# Patient Record
Sex: MALE | Race: WHITE | Employment: OTHER | URBAN - NONMETROPOLITAN AREA
[De-identification: names, ages, dates, MRNs, and addresses within clinical notes are randomized per-mention and may not be internally consistent; named-entity substitution may affect disease eponyms.]

---

## 2020-01-01 ENCOUNTER — TELEPHONE (OUTPATIENT)
Dept: FAMILY MEDICINE CLINIC | Facility: CLINIC | Age: 80
End: 2020-01-01

## 2020-10-08 ENCOUNTER — TELEPHONE (OUTPATIENT)
Dept: FAMILY MEDICINE CLINIC | Facility: CLINIC | Age: 80
End: 2020-10-08

## 2020-10-08 NOTE — TELEPHONE ENCOUNTER
MEDICAL RECORD REQUEST FAXED TO DR FREITAS LRFJOELIS'V OFFICE FOR RECORDS FROM OFFICE NOTES SEPTEMBER 2020

## 2020-10-13 ENCOUNTER — OFFICE VISIT (OUTPATIENT)
Dept: FAMILY MEDICINE CLINIC | Facility: CLINIC | Age: 80
End: 2020-10-13
Payer: MEDICARE

## 2020-10-13 ENCOUNTER — LAB ENCOUNTER (OUTPATIENT)
Dept: LAB | Age: 80
End: 2020-10-13
Attending: INTERNAL MEDICINE
Payer: MEDICARE

## 2020-10-13 VITALS
SYSTOLIC BLOOD PRESSURE: 128 MMHG | BODY MASS INDEX: 24.96 KG/M2 | TEMPERATURE: 97 F | HEIGHT: 67 IN | WEIGHT: 159 LBS | DIASTOLIC BLOOD PRESSURE: 86 MMHG | OXYGEN SATURATION: 98 % | RESPIRATION RATE: 16 BRPM | HEART RATE: 86 BPM

## 2020-10-13 DIAGNOSIS — I10 ESSENTIAL HYPERTENSION: Primary | ICD-10-CM

## 2020-10-13 DIAGNOSIS — R60.0 LEG EDEMA, LEFT: ICD-10-CM

## 2020-10-13 DIAGNOSIS — E78.00 HYPERCHOLESTEREMIA: ICD-10-CM

## 2020-10-13 DIAGNOSIS — I10 ESSENTIAL HYPERTENSION: ICD-10-CM

## 2020-10-13 DIAGNOSIS — G14 POST-POLIO SYNDROME: ICD-10-CM

## 2020-10-13 PROCEDURE — 99203 OFFICE O/P NEW LOW 30 MIN: CPT | Performed by: INTERNAL MEDICINE

## 2020-10-13 PROCEDURE — 90662 IIV NO PRSV INCREASED AG IM: CPT | Performed by: INTERNAL MEDICINE

## 2020-10-13 PROCEDURE — G0008 ADMIN INFLUENZA VIRUS VAC: HCPCS | Performed by: INTERNAL MEDICINE

## 2020-10-13 PROCEDURE — 80053 COMPREHEN METABOLIC PANEL: CPT

## 2020-10-13 PROCEDURE — 85025 COMPLETE CBC W/AUTO DIFF WBC: CPT

## 2020-10-13 PROCEDURE — 36415 COLL VENOUS BLD VENIPUNCTURE: CPT

## 2020-10-13 PROCEDURE — 80061 LIPID PANEL: CPT

## 2020-10-13 RX ORDER — FLUTICASONE PROPIONATE 50 MCG
50 SPRAY, SUSPENSION (ML) NASAL AS NEEDED
COMMUNITY
Start: 2020-08-20

## 2020-10-13 RX ORDER — OMEPRAZOLE 40 MG/1
40 CAPSULE, DELAYED RELEASE ORAL DAILY
COMMUNITY
Start: 2020-09-29

## 2020-10-13 RX ORDER — TRIAMTERENE AND HYDROCHLOROTHIAZIDE 37.5; 25 MG/1; MG/1
37.5 CAPSULE ORAL DAILY
COMMUNITY
Start: 2020-09-29 | End: 2020-10-14

## 2020-10-13 RX ORDER — POTASSIUM CHLORIDE 1500 MG/1
20 TABLET, FILM COATED, EXTENDED RELEASE ORAL DAILY
COMMUNITY
Start: 2020-07-14 | End: 2021-01-01

## 2020-10-13 RX ORDER — UBIDECARENONE 75 MG
250 CAPSULE ORAL DAILY
COMMUNITY

## 2020-10-13 RX ORDER — ATORVASTATIN CALCIUM 80 MG/1
80 TABLET, FILM COATED ORAL DAILY
COMMUNITY
Start: 2020-09-29

## 2020-10-13 RX ORDER — BENAZEPRIL HYDROCHLORIDE 40 MG/1
40 TABLET, FILM COATED ORAL DAILY
COMMUNITY
Start: 2020-09-29

## 2020-10-13 NOTE — PROGRESS NOTES
Ailyn Mathur is a 68year old male. HPI:   Pt has moved to [place] from [place] to be closer to family. 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   He has significant dementia and uses humor to cover up his i atraumatic, normocephalic,ears and throat are clear  NECK: supple,no adenopathy,no bruits  LUNGS: clear to auscultation  CARDIO: RRR without murmur  GI: good BS's,no masses, HSM or tenderness  EXTREMITIES: bilateral edema, left leg swelling plus 4 pitting

## 2020-10-14 PROBLEM — G14 POST-POLIO SYNDROME: Status: ACTIVE | Noted: 2020-10-14

## 2020-10-14 PROBLEM — Z96.652 S/P TKR (TOTAL KNEE REPLACEMENT), LEFT: Status: ACTIVE | Noted: 2020-10-14

## 2020-10-14 PROBLEM — F02.80 LATE ONSET ALZHEIMER'S DISEASE WITHOUT BEHAVIORAL DISTURBANCE (HCC): Status: ACTIVE | Noted: 2020-10-14

## 2020-10-14 PROBLEM — E78.00 HYPERCHOLESTEROLEMIA: Status: ACTIVE | Noted: 2020-10-14

## 2020-10-14 PROBLEM — G30.1 LATE ONSET ALZHEIMER'S DISEASE WITHOUT BEHAVIORAL DISTURBANCE (HCC): Status: ACTIVE | Noted: 2020-10-14

## 2020-10-14 PROBLEM — K21.9 GASTROESOPHAGEAL REFLUX DISEASE WITHOUT ESOPHAGITIS: Status: ACTIVE | Noted: 2020-10-14

## 2020-10-14 PROBLEM — M51.36 DDD (DEGENERATIVE DISC DISEASE), LUMBAR: Status: ACTIVE | Noted: 2020-10-14

## 2020-10-14 PROBLEM — M48.00 CENTRAL STENOSIS OF SPINAL CANAL: Status: ACTIVE | Noted: 2020-10-14

## 2020-10-14 RX ORDER — FUROSEMIDE 20 MG/1
20 TABLET ORAL DAILY
Qty: 30 TABLET | Refills: 0 | Status: SHIPPED | OUTPATIENT
Start: 2020-10-14 | End: 2020-10-16

## 2020-10-16 RX ORDER — FUROSEMIDE 20 MG/1
20 TABLET ORAL DAILY
Qty: 30 TABLET | Refills: 0 | Status: SHIPPED | OUTPATIENT
Start: 2020-10-16 | End: 2020-01-01

## 2020-10-29 ENCOUNTER — LAB ENCOUNTER (OUTPATIENT)
Dept: LAB | Age: 80
End: 2020-10-29
Attending: INTERNAL MEDICINE
Payer: MEDICARE

## 2020-10-29 ENCOUNTER — OFFICE VISIT (OUTPATIENT)
Dept: FAMILY MEDICINE CLINIC | Facility: CLINIC | Age: 80
End: 2020-10-29
Payer: MEDICARE

## 2020-10-29 VITALS
TEMPERATURE: 98 F | OXYGEN SATURATION: 97 % | SYSTOLIC BLOOD PRESSURE: 122 MMHG | DIASTOLIC BLOOD PRESSURE: 72 MMHG | RESPIRATION RATE: 20 BRPM | HEART RATE: 70 BPM | HEIGHT: 67 IN | BODY MASS INDEX: 24.7 KG/M2 | WEIGHT: 157.38 LBS

## 2020-10-29 DIAGNOSIS — R60.0 LEG EDEMA, LEFT: Primary | ICD-10-CM

## 2020-10-29 DIAGNOSIS — R60.0 LEG EDEMA, LEFT: ICD-10-CM

## 2020-10-29 PROCEDURE — 36415 COLL VENOUS BLD VENIPUNCTURE: CPT

## 2020-10-29 PROCEDURE — 99214 OFFICE O/P EST MOD 30 MIN: CPT | Performed by: INTERNAL MEDICINE

## 2020-10-29 PROCEDURE — 80053 COMPREHEN METABOLIC PANEL: CPT

## 2020-10-29 NOTE — PROGRESS NOTES
Selvin Cowan is a 68year old male. HPI:   Pt has been taking lasix at an increased does and leg swelling is much better. He has been taking potassium too.   Needs [place] ID, but can no longer drive and parking palacard for the car due to his post polio s and throat are clear  NECK: supple,no adenopathy,no bruits  LUNGS: clear to auscultation  CARDIO: RRR without murmur  GI: good BS's,no masses, HSM or tenderness  EXTREMITIES: no cyanosis, edema in the left leg only, doughy, not pitting in the tibia and has

## 2020-11-06 ENCOUNTER — TELEPHONE (OUTPATIENT)
Dept: FAMILY MEDICINE CLINIC | Facility: CLINIC | Age: 80
End: 2020-11-06

## 2020-11-06 NOTE — TELEPHONE ENCOUNTER
Called to  by registration staff. States patient and wife are in lobby here requesting appt this afternoon with Dr. Feliz Rodriguez.  Per patient he fell at home 2 days ago, tripped over something in living room and fell onto carpeted floor hitting left trey

## 2020-11-13 NOTE — TELEPHONE ENCOUNTER
INSURANCE WILL NOT FILL MORE THAN 7 DAYS OF TRAMADOL THEN WILL NEED A NEW SCRIPT, ALSO  NEEDS INDICATION (CHRONIC PAIN OR QPAIN)    PLEASE CALL PHARMACY

## 2020-11-14 NOTE — PROGRESS NOTES
Jass Lees is a 68year old male. HPI:   Pt has been having more pain in the back and irritable. His dementia is worsening and has some personality changes. Impatient, argumentative and poor appetite.    Current Outpatient Medications   Medication S nourished,in no apparent distress  SKIN: no rashes,no suspicious lesions  HEENT: atraumatic, normocephalic,ears and throat are clear  NECK: supple,no adenopathy,no bruits  LUNGS: clear to auscultation  CARDIO: RRR without murmur  GI: good BS's,no masses, H

## 2020-11-20 NOTE — TELEPHONE ENCOUNTER
Talk to nurse about medication, she is outside in the yard and will call back after 1pm to speak to nurse

## 2020-12-10 NOTE — PROGRESS NOTES
Mallorie Carpenter is a 68year old male. HPI:   Symptoms are escalating. He is angry and frustrated, His body hurts all the time. Risperidone is helping and sometimes his wife will give him an additional one in the day time.  He is aggressive with his cane 97.5 °F (36.4 °C)   Resp 16   Ht 5' 7\" (1.702 m)   Wt 162 lb (73.5 kg)   SpO2 96%   BMI 25.37 kg/m²   GENERAL: well developed, well nourished,in no apparent distress  SKIN: no rashes,no suspicious lesions  HEENT: atraumatic, normocephalic,ears and throat

## 2020-12-11 NOTE — TELEPHONE ENCOUNTER
Called Dr. Jonna Hawkins office. Appointment scheduled for 1/27/2021 in [place] at [address] [facility].  Wife notified. Contact information given. Routed to Dr. Chacho Corcoran for fyi.

## 2020-12-14 NOTE — TELEPHONE ENCOUNTER
Wojciech Orozco is calling when she went to  Aldo's medication from [facility] in [place], they would only give her the Sertraline 50 mg but they said that they wanted to speak with Dr Feliz Rodriguez prior to giving them the risperidone 0.25 mg.   Wojciech Orozco would like to speak

## 2020-12-14 NOTE — TELEPHONE ENCOUNTER
Alex Liz at [facility] said they are getting the script for risperidone redy for the patient.  Attempted to call wife, DAYANNA at 230pm. DAYANNA 455pm

## 2021-01-01 ENCOUNTER — OFFICE VISIT (OUTPATIENT)
Dept: FAMILY MEDICINE CLINIC | Facility: CLINIC | Age: 81
End: 2021-01-01
Payer: MEDICARE

## 2021-01-01 ENCOUNTER — TELEPHONE (OUTPATIENT)
Dept: FAMILY MEDICINE CLINIC | Facility: CLINIC | Age: 81
End: 2021-01-01

## 2021-01-01 ENCOUNTER — MED REC SCAN ONLY (OUTPATIENT)
Dept: FAMILY MEDICINE CLINIC | Facility: CLINIC | Age: 81
End: 2021-01-01

## 2021-01-01 ENCOUNTER — LAB ENCOUNTER (OUTPATIENT)
Dept: LAB | Age: 81
End: 2021-01-01
Attending: INTERNAL MEDICINE
Payer: MEDICARE

## 2021-01-01 ENCOUNTER — HOSPITAL ENCOUNTER (OUTPATIENT)
Dept: GENERAL RADIOLOGY | Age: 81
Discharge: HOME | End: 2021-01-01
Attending: NURSE PRACTITIONER
Payer: MEDICARE

## 2021-01-01 ENCOUNTER — TELEPHONE (OUTPATIENT)
Dept: NEUROLOGY | Facility: CLINIC | Age: 81
End: 2021-01-01

## 2021-01-01 VITALS
HEART RATE: 68 BPM | OXYGEN SATURATION: 97 % | DIASTOLIC BLOOD PRESSURE: 70 MMHG | SYSTOLIC BLOOD PRESSURE: 120 MMHG | TEMPERATURE: 98 F | BODY MASS INDEX: 25 KG/M2 | WEIGHT: 172 LBS

## 2021-01-01 VITALS
OXYGEN SATURATION: 97 % | RESPIRATION RATE: 20 BRPM | HEIGHT: 69 IN | HEART RATE: 82 BPM | BODY MASS INDEX: 24.88 KG/M2 | DIASTOLIC BLOOD PRESSURE: 80 MMHG | TEMPERATURE: 97 F | WEIGHT: 168 LBS | SYSTOLIC BLOOD PRESSURE: 128 MMHG

## 2021-01-01 VITALS
SYSTOLIC BLOOD PRESSURE: 138 MMHG | DIASTOLIC BLOOD PRESSURE: 80 MMHG | HEIGHT: 69 IN | BODY MASS INDEX: 24.88 KG/M2 | HEART RATE: 63 BPM | RESPIRATION RATE: 18 BRPM | OXYGEN SATURATION: 98 % | WEIGHT: 168 LBS | TEMPERATURE: 97 F

## 2021-01-01 VITALS
SYSTOLIC BLOOD PRESSURE: 109 MMHG | DIASTOLIC BLOOD PRESSURE: 65 MMHG | HEIGHT: 69 IN | OXYGEN SATURATION: 96 % | RESPIRATION RATE: 18 BRPM | HEART RATE: 83 BPM | BODY MASS INDEX: 25.33 KG/M2 | WEIGHT: 171 LBS | TEMPERATURE: 97 F

## 2021-01-01 VITALS
WEIGHT: 175 LBS | HEIGHT: 69 IN | OXYGEN SATURATION: 98 % | BODY MASS INDEX: 25.92 KG/M2 | TEMPERATURE: 99 F | DIASTOLIC BLOOD PRESSURE: 72 MMHG | RESPIRATION RATE: 18 BRPM | HEART RATE: 86 BPM | SYSTOLIC BLOOD PRESSURE: 119 MMHG

## 2021-01-01 VITALS
WEIGHT: 170.5 LBS | TEMPERATURE: 97 F | HEART RATE: 67 BPM | DIASTOLIC BLOOD PRESSURE: 72 MMHG | BODY MASS INDEX: 25.25 KG/M2 | RESPIRATION RATE: 16 BRPM | OXYGEN SATURATION: 97 % | HEIGHT: 69 IN | SYSTOLIC BLOOD PRESSURE: 126 MMHG

## 2021-01-01 VITALS
DIASTOLIC BLOOD PRESSURE: 80 MMHG | HEART RATE: 86 BPM | SYSTOLIC BLOOD PRESSURE: 136 MMHG | HEIGHT: 69 IN | RESPIRATION RATE: 18 BRPM | OXYGEN SATURATION: 95 % | WEIGHT: 173 LBS | BODY MASS INDEX: 25.62 KG/M2 | TEMPERATURE: 98 F

## 2021-01-01 VITALS
HEART RATE: 56 BPM | SYSTOLIC BLOOD PRESSURE: 120 MMHG | WEIGHT: 168 LBS | BODY MASS INDEX: 25 KG/M2 | OXYGEN SATURATION: 98 % | DIASTOLIC BLOOD PRESSURE: 80 MMHG | TEMPERATURE: 98 F

## 2021-01-01 VITALS
RESPIRATION RATE: 18 BRPM | HEIGHT: 69 IN | WEIGHT: 174 LBS | TEMPERATURE: 97 F | BODY MASS INDEX: 25.77 KG/M2 | SYSTOLIC BLOOD PRESSURE: 140 MMHG | OXYGEN SATURATION: 97 % | DIASTOLIC BLOOD PRESSURE: 78 MMHG | HEART RATE: 86 BPM

## 2021-01-01 DIAGNOSIS — R60.9 PERIPHERAL EDEMA: Primary | ICD-10-CM

## 2021-01-01 DIAGNOSIS — I10 ESSENTIAL HYPERTENSION: Primary | ICD-10-CM

## 2021-01-01 DIAGNOSIS — M48.061 DEGENERATIVE LUMBAR SPINAL STENOSIS: ICD-10-CM

## 2021-01-01 DIAGNOSIS — Z23 NEED FOR VACCINATION: ICD-10-CM

## 2021-01-01 DIAGNOSIS — D50.8 IRON DEFICIENCY ANEMIA SECONDARY TO INADEQUATE DIETARY IRON INTAKE: ICD-10-CM

## 2021-01-01 DIAGNOSIS — M19.90 ARTHRITIS: ICD-10-CM

## 2021-01-01 DIAGNOSIS — I10 ESSENTIAL HYPERTENSION: ICD-10-CM

## 2021-01-01 DIAGNOSIS — R53.1 WEAKNESS: ICD-10-CM

## 2021-01-01 DIAGNOSIS — D50.8 OTHER IRON DEFICIENCY ANEMIA: ICD-10-CM

## 2021-01-01 DIAGNOSIS — R29.6 FREQUENT FALLS: ICD-10-CM

## 2021-01-01 DIAGNOSIS — R26.9 GAIT ABNORMALITY: Primary | ICD-10-CM

## 2021-01-01 DIAGNOSIS — E78.00 HYPERCHOLESTEREMIA: ICD-10-CM

## 2021-01-01 DIAGNOSIS — R60.0 LEG EDEMA, LEFT: ICD-10-CM

## 2021-01-01 DIAGNOSIS — D64.9 ANEMIA, UNSPECIFIED TYPE: ICD-10-CM

## 2021-01-01 DIAGNOSIS — G30.1 LATE ONSET ALZHEIMER'S DISEASE WITHOUT BEHAVIORAL DISTURBANCE (HCC): ICD-10-CM

## 2021-01-01 DIAGNOSIS — H00.022 HORDEOLUM INTERNUM OF RIGHT LOWER EYELID: Primary | ICD-10-CM

## 2021-01-01 DIAGNOSIS — F02.80 LATE ONSET ALZHEIMER'S DISEASE WITHOUT BEHAVIORAL DISTURBANCE (HCC): ICD-10-CM

## 2021-01-01 DIAGNOSIS — L72.3 SEBACEOUS CYST: ICD-10-CM

## 2021-01-01 DIAGNOSIS — M48.061 DEGENERATIVE LUMBAR SPINAL STENOSIS: Primary | ICD-10-CM

## 2021-01-01 DIAGNOSIS — G14 POST-POLIO SYNDROME: Primary | ICD-10-CM

## 2021-01-01 DIAGNOSIS — D64.9 ANEMIA, UNSPECIFIED TYPE: Primary | ICD-10-CM

## 2021-01-01 DIAGNOSIS — Z00.00 ENCOUNTER FOR ANNUAL HEALTH EXAMINATION: ICD-10-CM

## 2021-01-01 DIAGNOSIS — G14 POST-POLIO SYNDROME: ICD-10-CM

## 2021-01-01 LAB
ALBUMIN SERPL-MCNC: 3.7 G/DL (ref 3.4–5)
ALBUMIN/GLOB SERPL: 1.2 {RATIO} (ref 1–2)
ALP LIVER SERPL-CCNC: 99 U/L
ALT SERPL-CCNC: 30 U/L
ANION GAP SERPL CALC-SCNC: 5 MMOL/L (ref 0–18)
AST SERPL-CCNC: 24 U/L (ref 15–37)
BASOPHILS # BLD AUTO: 0.04 X10(3) UL (ref 0–0.2)
BASOPHILS NFR BLD AUTO: 0.8 %
BILIRUB SERPL-MCNC: 0.5 MG/DL (ref 0.1–2)
BUN BLD-MCNC: 16 MG/DL (ref 7–18)
BUN/CREAT SERPL: 22.9 (ref 10–20)
CALCIUM BLD-MCNC: 8.9 MG/DL (ref 8.5–10.1)
CHLORIDE SERPL-SCNC: 106 MMOL/L (ref 98–112)
CHOLEST SMN-MCNC: 121 MG/DL (ref ?–200)
CO2 SERPL-SCNC: 27 MMOL/L (ref 21–32)
CREAT BLD-MCNC: 0.7 MG/DL
DEPRECATED RDW RBC AUTO: 48.7 FL (ref 35.1–46.3)
EOSINOPHIL # BLD AUTO: 0.04 X10(3) UL (ref 0–0.7)
EOSINOPHIL NFR BLD AUTO: 0.8 %
ERYTHROCYTE [DISTWIDTH] IN BLOOD BY AUTOMATED COUNT: 14.3 % (ref 11–15)
GLOBULIN PLAS-MCNC: 3.2 G/DL (ref 2.8–4.4)
GLUCOSE BLD-MCNC: 116 MG/DL (ref 70–99)
HCT VFR BLD AUTO: 35.8 %
HDLC SERPL-MCNC: 44 MG/DL (ref 40–59)
HGB BLD-MCNC: 11.4 G/DL
IMM GRANULOCYTES # BLD AUTO: 0.01 X10(3) UL (ref 0–1)
IMM GRANULOCYTES NFR BLD: 0.2 %
LDLC SERPL CALC-MCNC: 61 MG/DL (ref ?–100)
LYMPHOCYTES # BLD AUTO: 1.01 X10(3) UL (ref 1–4)
LYMPHOCYTES NFR BLD AUTO: 19.7 %
M PROTEIN MFR SERPL ELPH: 6.9 G/DL (ref 6.4–8.2)
MCH RBC QN AUTO: 29.5 PG (ref 26–34)
MCHC RBC AUTO-ENTMCNC: 31.8 G/DL (ref 31–37)
MCV RBC AUTO: 92.5 FL
MONOCYTES # BLD AUTO: 0.49 X10(3) UL (ref 0.1–1)
MONOCYTES NFR BLD AUTO: 9.6 %
NEUTROPHILS # BLD AUTO: 3.53 X10 (3) UL (ref 1.5–7.7)
NEUTROPHILS # BLD AUTO: 3.53 X10(3) UL (ref 1.5–7.7)
NEUTROPHILS NFR BLD AUTO: 68.9 %
NONHDLC SERPL-MCNC: 77 MG/DL (ref ?–130)
OSMOLALITY SERPL CALC.SUM OF ELEC: 288 MOSM/KG (ref 275–295)
PLATELET # BLD AUTO: 222 10(3)UL (ref 150–450)
POTASSIUM SERPL-SCNC: 3.7 MMOL/L (ref 3.5–5.1)
RBC # BLD AUTO: 3.87 X10(6)UL
SODIUM SERPL-SCNC: 138 MMOL/L (ref 136–145)
T4 FREE SERPL-MCNC: 1 NG/DL (ref 0.8–1.7)
TRIGL SERPL-MCNC: 80 MG/DL (ref 30–149)
TSI SER-ACNC: 0.68 MIU/ML (ref 0.36–3.74)
VLDLC SERPL CALC-MCNC: 16 MG/DL (ref 0–30)
WBC # BLD AUTO: 5.1 X10(3) UL (ref 4–11)

## 2021-01-01 PROCEDURE — 72100 X-RAY EXAM L-S SPINE 2/3 VWS: CPT | Performed by: NURSE PRACTITIONER

## 2021-01-01 PROCEDURE — 80053 COMPREHEN METABOLIC PANEL: CPT

## 2021-01-01 PROCEDURE — 36415 COLL VENOUS BLD VENIPUNCTURE: CPT

## 2021-01-01 PROCEDURE — 99214 OFFICE O/P EST MOD 30 MIN: CPT | Performed by: INTERNAL MEDICINE

## 2021-01-01 PROCEDURE — 84439 ASSAY OF FREE THYROXINE: CPT

## 2021-01-01 PROCEDURE — G0439 PPPS, SUBSEQ VISIT: HCPCS | Performed by: INTERNAL MEDICINE

## 2021-01-01 PROCEDURE — 84443 ASSAY THYROID STIM HORMONE: CPT

## 2021-01-01 PROCEDURE — 85025 COMPLETE CBC W/AUTO DIFF WBC: CPT

## 2021-01-01 PROCEDURE — 99213 OFFICE O/P EST LOW 20 MIN: CPT | Performed by: FAMILY MEDICINE

## 2021-01-01 PROCEDURE — 80061 LIPID PANEL: CPT

## 2021-01-01 PROCEDURE — 99214 OFFICE O/P EST MOD 30 MIN: CPT | Performed by: NURSE PRACTITIONER

## 2021-01-01 PROCEDURE — 82728 ASSAY OF FERRITIN: CPT

## 2021-01-01 PROCEDURE — 85652 RBC SED RATE AUTOMATED: CPT

## 2021-01-01 RX ORDER — FUROSEMIDE 20 MG/1
20 TABLET ORAL DAILY
Qty: 90 TABLET | Refills: 0 | Status: SHIPPED | OUTPATIENT
Start: 2021-01-01 | End: 2021-01-01

## 2021-01-01 RX ORDER — CLINDAMYCIN HYDROCHLORIDE 300 MG/1
300 CAPSULE ORAL DAILY
COMMUNITY
Start: 2021-01-01

## 2021-01-01 RX ORDER — SPIRONOLACTONE 25 MG/1
25 TABLET ORAL DAILY
Qty: 90 TABLET | Refills: 3 | Status: SHIPPED | OUTPATIENT
Start: 2021-01-01 | End: 2021-01-01

## 2021-01-01 RX ORDER — CEPHALEXIN 500 MG/1
500 CAPSULE ORAL 3 TIMES DAILY
Qty: 21 CAPSULE | Refills: 0 | Status: SHIPPED | OUTPATIENT
Start: 2021-01-01 | End: 2021-01-01

## 2021-01-01 RX ORDER — ERYTHROMYCIN 5 MG/G
1 OINTMENT OPHTHALMIC NIGHTLY
Qty: 1 G | Refills: 0 | Status: SHIPPED | OUTPATIENT
Start: 2021-01-01 | End: 2021-01-01

## 2021-01-01 RX ORDER — SPIRONOLACTONE 25 MG/1
25 TABLET ORAL DAILY
COMMUNITY
End: 2021-01-01

## 2021-01-01 RX ORDER — FUROSEMIDE 20 MG/1
20 TABLET ORAL DAILY
Qty: 30 TABLET | Refills: 0 | Status: SHIPPED | OUTPATIENT
Start: 2021-01-01 | End: 2021-01-01

## 2021-01-01 RX ORDER — RISPERIDONE 0.25 MG/1
0.25 TABLET, FILM COATED ORAL 2 TIMES DAILY
Qty: 180 TABLET | Refills: 3 | Status: SHIPPED | OUTPATIENT
Start: 2021-01-01 | End: 2021-01-01

## 2021-01-01 RX ORDER — ACETAMINOPHEN AND CODEINE PHOSPHATE 120; 12 MG/5ML; MG/5ML
10 SOLUTION ORAL EVERY 6 HOURS PRN
Qty: 437 ML | Refills: 0 | Status: SHIPPED | OUTPATIENT
Start: 2021-01-01 | End: 2021-01-01

## 2021-01-01 RX ORDER — LIDOCAINE 4 G/G
1 PATCH TOPICAL EVERY 24 HOURS
Qty: 30 PATCH | Refills: 0 | Status: SHIPPED | OUTPATIENT
Start: 2021-01-01 | End: 2021-01-01

## 2021-01-01 RX ORDER — RISPERIDONE 0.25 MG/1
TABLET, FILM COATED ORAL
Qty: 180 TABLET | Refills: 0 | Status: SHIPPED | OUTPATIENT
Start: 2021-01-01

## 2021-01-01 RX ORDER — RISPERIDONE 0.25 MG/1
0.25 TABLET, FILM COATED ORAL DAILY
COMMUNITY
Start: 2021-01-01 | End: 2021-01-01

## 2021-01-01 RX ORDER — FUROSEMIDE 20 MG/1
20 TABLET ORAL EVERY MORNING
Qty: 7 TABLET | Refills: 0 | Status: SHIPPED | OUTPATIENT
Start: 2021-01-01 | End: 2021-01-01

## 2021-01-05 NOTE — TELEPHONE ENCOUNTER
Steve Fuentes said that he has a prescription for Furosemide 40mg from his former doctor in [place]. She said his left leg is still swollen.  She said that it is normally larger than the other one due to a bone graft he had done in the past. She said that he has b

## 2021-01-05 NOTE — TELEPHONE ENCOUNTER
Wife advised Dr Akin Thompson wants him to stop taking Lasix since he will not take the Potassium, just wait until the Spironolactone arrives.  V.O. Dr Landy Renner RN

## 2021-01-05 NOTE — TELEPHONE ENCOUNTER
I WOULD LIKE HIM TO STOP LASIX AND POTASSIUM AND START SPIRONOLACTONE. THIS HE WILL NOT HAVE TO TAKE POTASSIUM WITH. I HAVE SENT IT TO [facility]

## 2021-01-05 NOTE — TELEPHONE ENCOUNTER
Wife advised. She said that she would like it to go to [facility]. Is it ok for him to take the Lasix he has until the Spironolactone arrives?

## 2021-01-05 NOTE — TELEPHONE ENCOUNTER
Furosemide, plano [facility]. Is he supposed to be taking this pill forever? Call Lily Mccarthy. She has a mail order pharmacy too.

## 2021-01-26 NOTE — TELEPHONE ENCOUNTER
Pt has a stye and spouse wants to know if there is cream she can use? Did Dr Melida Clement from [facility] send his records? When was his last wellness visit?

## 2021-01-26 NOTE — TELEPHONE ENCOUNTER
Wife advised Dr Fazal Quintero prescribed Erythromycin ointment. Script cancelled at [facility] and sent to [facility]. Wife advised he can have his wellness visit at anytime.  She said she thinks his last wellness was the end of February last year so she will schedule

## 2021-01-27 NOTE — PROGRESS NOTES
[address] [facility]  1/27/2021    1:16 PM      Memory problems    2019 first consulted doctor who put him on something that made him have GI. Wife does not recall what it was.    For a while he 180 tablet, Rfl: 0      ROS:  No CP or SOB.   Arthritis   Rest of 10 point ROS unrermakable    EXAM:  /74 (BP Location: Left arm, Patient Position: Sitting, Cuff Size: adult)   Pulse 70   Resp 16   Ht 67\"   Wt 166 lb (75.3 kg)   BMI 26.00 kg/m²   Mel

## 2021-01-27 NOTE — PROGRESS NOTES
Patient's spouse is with patient today. Patient's spouse reports that she started noticing memory issues in 2019. Per spouse, memory has been declining more rapidly lately.

## 2021-01-28 NOTE — PROGRESS NOTES
Sara Pitchrolf is a 68year old male. HPI:   Pt has a large swelling in the medial corner of the lower right eye lid. He has been using a warm compress and just started using erythromycin ointment.   He did see a neurologist yesterday, he has severe, pro headaches    EXAM:   /80 (BP Location: Right arm, Patient Position: Sitting, Cuff Size: large)   Pulse 82   Temp 97.2 °F (36.2 °C) (Temporal)   Resp 20   Ht 5' 9\" (1.753 m)   Wt 168 lb (76.2 kg)   SpO2 97%   BMI 24.81 kg/m²   GENERAL: well developed

## 2021-02-05 NOTE — PROGRESS NOTES
HPI:    Patient ID: Mallorie Carpenter is a 68year old male. Patient presents with:  Sty    Here w/ wife  Patient was seen by Dr. Devin Chambers on 1/28 for a stye in his right eye.   He was advised to use warm compresses and erythromycin ointment night  Patient's Penicillins             UNKNOWN   PHYSICAL EXAM:   Physical Exam   Constitutional: He appears well-nourished. No distress. Eyes: Lids are everted and swept, no foreign bodies found. Right eye exhibits hordeolum. Left eye exhibits no hordeolum.

## 2021-02-05 NOTE — PATIENT INSTRUCTIONS
I reassured patient's wife that this does appear to be a stye.   Discussed causes and contributing factors, no indication for I&D  I have asked her to continue her warm moist compresses followed by baby shampoo lid scrubs and application of the erythromycin

## 2021-02-09 NOTE — TELEPHONE ENCOUNTER
Wife advised. She said that she was hoping that the doctor would give her more information about what Ole Dose knows and what he is thinking. She will call Dr Mary King office to get the report.

## 2021-02-09 NOTE — TELEPHONE ENCOUNTER
Wife advised that we cannot give them notes from another provider. She asked if there is anything that she needs to know. She said she was not satisfied with the visit.

## 2021-02-09 NOTE — TELEPHONE ENCOUNTER
PATIENT WAS SEEN BY DR Indra Marie ON [facility] [address] AND HAVE WE RECEIVED THE REPORT YET AND WIFE WANTS A COPY ALSO

## 2021-02-09 NOTE — TELEPHONE ENCOUNTER
He recommended continuing risperdal and maybe start counseling/ behavioral therapy. He did not think meds that preserve memory would be of any help right now.

## 2021-02-22 NOTE — TELEPHONE ENCOUNTER
Wife said that she keeps the medication in the basement and she cannot find the \"water pills\". Josef Kuhn from [facility] states that it was mailed out on 1/11/21. Wife checked and said she found the medications.

## 2021-03-04 NOTE — PROGRESS NOTES
HPI:   Sara Cates is a 68year old male who presents for a Medicare Subsequent Annual Wellness visit (Pt already had Initial Annual Wellness). Pt has severe, progressive dementia.   Wife is caring for him, but struggles because he will sometimes Daily Activities based on screening of functional status. Problems with daily activities? : Yes   He has problems with Memory based on screening of functional status.    Memory Problems?: Yes       Depression Screening (PHQ-2/PHQ-9): Over the LAST 2 WEEKS Component Value Date    CHOLEST 121 03/03/2021    HDL 44 03/03/2021    LDL 61 03/03/2021    TRIG 80 03/03/2021          Last Chemistry Labs:   Lab Results   Component Value Date    AST 24 03/03/2021    ALT 30 03/03/2021    CA 8.9 03/03/2021    ALB 3.7 03 exertion  CARDIOVASCULAR: denies chest pain on exertion  GI: denies abdominal pain, denies heartburn  : 1 per night nocturia, no complaint of urinary incontinence  MUSCULOSKELETAL: denies back pain  NEURO: denies headaches  PSYCHE: denies depression or a rub or gallop   Abdomen:   Soft, non-tender, bowel sounds active all four quadrants,  no masses, no organomegaly   Genitalia: Normal male   Rectal: Normal tone, normal prostate, no masses or tenderness   Extremities: Extremities normal, atraumatic, no cyan Leg edema, left  Always swollen, but better with diuretic and compression    7. Post-polio syndrome  Weakness progressive, walks slowly and uses a cane.     Diet assessment: good     PLAN:  The patient indicates understanding of these issues and agrees to t (Update Immunization Activity if applicable)     Influenza  Covered Annually 10/13/2020   Please get every year    Pneumococcal 15 (Prevnar)  Covered Once after 65 No vaccine history found Please get once after your 65th birthday    Pneumococcal 23 (Pneumo

## 2021-03-04 NOTE — PATIENT INSTRUCTIONS
Ricky Duron's SCREENING SCHEDULE   Tests on this list are recommended by your physician but may not be covered, or covered at this frequency, by your insurer. Please check with your insurance carrier before scheduling to verify coverage.     Khang Escobar abnormal There are no preventive care reminders to display for this patient. Update Health Maintenance if applicable    Flex Sigmoidoscopy Screen  Covered every 5 years No results found for this or any previous visit. No flowsheet data found.      Fecal Occ previous visit. This may be covered with your prescription benefits, but Medicare does not cover unless Medically needed    Zoster (Not covered by Medicare Part B) No orders found for this or any previous visit.  This may be covered with your pharmacy  pres

## 2021-04-07 NOTE — PROGRESS NOTES
Sandra Rubalcava is a 68year old male. HPI:   Pt has been having more swelling in the feet. He is always confused and showers once a month. Often layers his clothes and does not like to take his wallet out of his pocket to sleep.    Current Outpatient KAYE 86   Temp 97.9 °F (36.6 °C) (Temporal)   Resp 18   Ht 5' 9\" (1.753 m)   Wt 173 lb (78.5 kg)   SpO2 95%   BMI 25.55 kg/m²   GENERAL: well developed, well nourished,in no apparent distress  SKIN: no rashes,no suspicious lesions  HEENT: atraumatic, normoceph

## 2021-04-09 NOTE — TELEPHONE ENCOUNTER
LOV  4/6/21    LAST LAB    LAST RX  12/10/20  90 tabs 3 refills    Next OV  No future appointments.       PROTOCOL  none

## 2021-04-14 NOTE — TELEPHONE ENCOUNTER
HE IS AT  BECAUSE HE FELL AND PASSED OUT IN THE BATHROOM THIS MORNING. THE AMBULANCE TOOK HIM TO  AND TESTS ARE BEING DONE NOW AND HE PROBABLY WILL BE ADMITTED.

## 2021-04-16 NOTE — TELEPHONE ENCOUNTER
Attempted to reach both, wife and daughter. No VM for Val Suero, spouse. Daughter's phone rang and then hung up.

## 2021-04-16 NOTE — TELEPHONE ENCOUNTER
Hans De Anda is in [address] is asking if there some way to have Nafisa Hemming released to come home. Call Sara Shankar she is asking for this to happen today. If Sara Shankar is not able to come to the phone please call Fransisco Garcia (daughter) [phone].

## 2021-04-19 NOTE — TELEPHONE ENCOUNTER
[facility] should be coming over to Dr. Katherin Ruiz re: medical supplies. She would like us to be on the look out for it as wife needs these supplies for him to be discharged.

## 2021-04-21 NOTE — TELEPHONE ENCOUNTER
SEEING PT FOR HOME HEALTH SERVICES, PT WAS DISCHARGED FROM [facility] FACE TO FACE NOTES FROM DR Zhao Varela, [address] [facility] # [phone]

## 2021-04-26 NOTE — PROGRESS NOTES
Joseph Holliday is a 68year old male. HPI:   Fall in the bathroom at home, nothing fractured, CT head and neck without bleed or fracture. He had an echo that was normal and neg troponin.   He was released and sent ot [facility] for rehab which he hated and retu Use      Smoking status: Former Smoker      Smokeless tobacco: Never Used      Tobacco comment: 1985    Vaping Use      Vaping Use: Never used    Alcohol use: Yes      Comment: not regular    Drug use: Never       REVIEW OF SYSTEMS:   GENERAL HEALTH: feels

## 2021-05-06 PROBLEM — R55 SYNCOPE: Status: ACTIVE | Noted: 2021-01-01

## 2021-05-06 NOTE — PROGRESS NOTES
HPI: HPI   Patient is here for follow up visit for ongoing lower back pain. Accompanied by wife, he provides majority of the history. Has had lower back pain for many years.  This morning he had more pain than normal. Saw a pain specialist when living in [place]    Tobacco Use      Smoking status: Former Smoker      Smokeless tobacco: Never Used      Tobacco comment: 1985    Vaping Use      Vaping Use: Never used    Alcohol use: Yes      Comment: not regular    Drug use: Never        REVIEW OF SYSTEMS:   R

## 2021-05-10 NOTE — TELEPHONE ENCOUNTER
Pharmacist states that patient is Opiod naive so they will only give him 7 tablets. We cannot do a prior authorization with Medicare. They will send us a refill request when the 7 days is completed. Dr Erich Mckeon is aware.

## 2021-05-13 NOTE — TELEPHONE ENCOUNTER
Kunal Bundy advised. She said that he is having difficulty taking oral medications. She is concerned that the Codeine will increase his risk of falling. She is going to tell the wife to try the Lidocaine patches.

## 2021-05-13 NOTE — TELEPHONE ENCOUNTER
Patient is having pain to lower back and right knee and is preventing him from moving around and doing PT. He is having a really hard time swallowing pills. Can Dr Laura Foss prescribe him a patch for his pain in his back and his knee?

## 2021-05-18 NOTE — TELEPHONE ENCOUNTER
Rojas Bailey said that Dr Viki Coughlin said that patient was anemic and that he needed to take the Ferrous Sulfate. She said that sometimes he does not want to take his medications except he will take the mood and depression medications.  She thinks that he needs to have

## 2021-05-18 NOTE — TELEPHONE ENCOUNTER
Quiana Samayoa is calling she would like to speak with Jameel about getting some labs ordered please call

## 2021-05-18 NOTE — TELEPHONE ENCOUNTER
He had a cholesterol in March 2021 and it was good.   He is mildly anemic since 10/2020 and if we really want to get to the bottom of it I can give Brian Elif a FIT kit to look for bloos in the stool and if it is present then refer him to GI to look for the sour

## 2021-05-18 NOTE — TELEPHONE ENCOUNTER
Wife advised. She said she does not want him to do the Fit test at this time. She asked if he should still continue the water pill (Furosemised), he is still having leg swelling. Advised yes, refill sent to [facility].

## 2021-05-20 NOTE — TELEPHONE ENCOUNTER
I think that be needs to see geriatric [facility]i, seems to be escalating FAST and if he is refusing meds then we have a problem. There are injectable medications available, through [facility]i. For now you could crush meds and put in pudding or applesauce.  I will m

## 2021-05-20 NOTE — TELEPHONE ENCOUNTER
Annie Croft said [facility] did not have the script for the Lidocaine patchesthat patient's left leg has 3+ pitting edema to the left ankle. He is on Furosemide but does not not take medications on a regular basis.  She said that he has been \"difficult for the w

## 2021-05-20 NOTE — TELEPHONE ENCOUNTER
Wife advised. She said she did  the Lidocaine patches OTC. Wife said that she will think over having patient see a geriatric psychiatrist. Russell Leader at home health advised.

## 2021-06-15 NOTE — PROGRESS NOTES
José Farooq is a 68year old male. HPI:   Pt has been having increased swelling in the left foot and ankle. He is sedentary and his leg was affected by polio. He has severe dementia and is taking meds voluntarily now.   He is a little agitated and s denies headaches    EXAM:   /78 (BP Location: Right arm, Patient Position: Sitting, Cuff Size: adult)   Pulse 86   Temp 96.7 °F (35.9 °C) (Temporal)   Resp 18   Ht 5' 9\" (1.753 m)   Wt 174 lb (78.9 kg)   SpO2 97%   BMI 25.70 kg/m²   GENERAL: well de

## 2021-06-21 NOTE — TELEPHONE ENCOUNTER
Christa Alba said Kathy Gomez has been taking Furosemide but not the Spirinolactone. She said that they received a bottle of Spironolactone from [facility] but she thought that Dr Fazal Quintero advised them to stop it.  She has not given it to him and is going to ask [facility]

## 2021-06-21 NOTE — TELEPHONE ENCOUNTER
Wife advised. Chiqui Maldonado at [facility] advised the Spironolactone is to be discontinued.  Wife advised to speak with Dr Flor Bauer at the next OV regarding the vascular dementia diagnosis

## 2021-06-21 NOTE — TELEPHONE ENCOUNTER
From the way I read it, I think he was supposed to have stopped after echo. On telephone note 4/19, she said no spironolactone. Would recommend stopping and will also send to Dr. Zenon Spatz to review.

## 2021-07-28 NOTE — PROGRESS NOTES
Silvia Cates is a 80year old male. HPI:   SWELLING of the legs, refusing to take his lasix, ACE and statin. Wife makes sure he gets his SSRI and risperidone. He has been a little more willing to shower and change his clothes.   Sometimes angry with h 98.8 °F (37.1 °C) (Temporal)   Resp 18   Ht 5' 9\" (1.753 m)   Wt 175 lb (79.4 kg)   SpO2 98%   BMI 25.84 kg/m²   GENERAL: well developed, well nourished,in no apparent distress  SKIN: no rashes,no suspicious lesions  HEENT: atraumatic, normocephalic,ears

## 2021-08-06 NOTE — TELEPHONE ENCOUNTER
Patient and wife would like DNR paperwork filled out and signed. Paperwork placed up front. [facility] notified.

## 2021-08-06 NOTE — TELEPHONE ENCOUNTER
JOSE AND HIS WIFE ARE WONDERING HOW TO GO ABOUT GETTING A DNR PAPER FILLED OUT AND HAVE AT HOME INCASE THEY NEED TO CALL THE AMBULANCE

## 2021-08-14 NOTE — PROGRESS NOTES
Rose Singh is a 80year old male. HPI:   Pt has been falling at home lately. Wife in trying to retain him in the home for as long as possible. She cannot however lift him herself.     Current Outpatient Medications   Medication Sig Dispense Refill denies chest pain on exertion  GI: denies abdominal pain and denies heartburn  NEURO: denies headaches    EXAM:   /72   Pulse 67   Temp 97.1 °F (36.2 °C) (Temporal)   Resp 16   Ht 5' 9\" (1.753 m)   Wt 170 lb 8 oz (77.3 kg)   SpO2 97%   BMI 25.18 kg/

## 2021-08-16 NOTE — TELEPHONE ENCOUNTER
SHE NEEDS THE ORDER FOR PHYSICAL THERAPY FAXED TO HOME [facility] [phone] SO HE CAN HAVE THERAPY IN HIS HOME.     CALL LILIANA  EITHER WAY SO SHE KNOWS

## 2021-08-16 NOTE — TELEPHONE ENCOUNTER
Called Shannan Regan at [facility]. Outpatient PT was ordered at last office visit. Patient is receiving home care. Unable to have outpatient PT when home care nurse is seeing patient.   Wife states that she is unable to take patient to outpatient PT,

## 2021-08-16 NOTE — TELEPHONE ENCOUNTER
GOT FAX ABOUT PT STARTING OUTPATIENT PHYSICAL THERAPY, IF HE DOES OUTPATIENT THEN HE WILL NEED TO BE DISCHARGED FROM HOME HEALTH, WIFE DOES NOT WANT HIM DOING OUTPATIENT BECAUSE IT IS TOO DIFFICULT, DOES DR [address] NURSE TO KEEP GOING OUT?

## 2021-08-30 NOTE — TELEPHONE ENCOUNTER
Velcro shoes, you can give him a ball of yarn to keep his hands busy. I can't do much else for the swelling he is not likely to comply with more meds, raising his feet, or compression socks.

## 2021-08-30 NOTE — TELEPHONE ENCOUNTER
HIS FEET ARE SWOLLEN AND HE TAKES A WATER PILL A DAY.   SHE WANTS TO KNOW WHY THIS IS NOT HELPING AT ALL

## 2021-08-30 NOTE — TELEPHONE ENCOUNTER
Wife said that patient's left foot is much more swollen than it has been. She said that his shoe is very tight. She said his is obsessed with lacing his shoes and he cuts the shoelaces off. She asked if she should hide the scissors. Advised yes.  She is con

## 2021-09-30 NOTE — TELEPHONE ENCOUNTER
Last refill: 07/20/21  Qty:  180  W/ 0 refills  Last ov: 08/12/21    Requested Prescriptions     Pending Prescriptions Disp Refills   • RISPERIDONE 0.25 MG Oral Tab [Pharmacy Med Name: RISPERIDONE 0.25 MG Tablet] 180 tablet 0     Sig: TAKE 1 TABLET TWICE D

## 2021-10-05 NOTE — TELEPHONE ENCOUNTER
JOSE FELL YESTERDAY WHILE WALKING INTO THE KITCHEN, HE HAD BLOOD IN HIS MOUTH, LILIANA THINKS IT WAS FROM BITING HIS TONGUE, HIS EYES WERE GLAZED OVER AND HE THEN URINATED ON THE FLOOR AND HAD A BM IN HIS PANTS.   SHE DID NOT CALL THE [facility], SHE CALLED HER KID

## 2021-10-07 NOTE — TELEPHONE ENCOUNTER
CURRENTLY TAKES RISPERIDONE 2 DAILY, WIFE THINKS HE SHOULD BE TAKING IT 3 TIMES A DAY, HE IS CURRENTLY @ [id number] [facility] BUT WILL BE GOING TO [facility], CALL WIFE

## 2021-10-07 NOTE — TELEPHONE ENCOUNTER
Concerned about aggressive behavior when asked to do things he does not want to do and separation from her.

## 2023-11-20 NOTE — PATIENT INSTRUCTIONS
After your visit at the [place] office  today,  please direct any follow up questions or medication needs to the staff in our  [place] office so that your concerns may be promptly addressed.   We are available through [facility] or at the numbers below: Physical Therapy    Visit Type: initial evaluation    Relevant History/Co-morbidities: h/o chronic ETOH abuse, recurrent falls    SUBJECTIVE  Patient agreed to participate in therapy this date.  RN in agreement to work with patient for therapy session.  I am doing fine. I have been in bed for the past 5 days, my legs are wobbly  Patient / Family Goal: maximize function and return home     OBJECTIVE     Cognitive Status   Orientation    - Oriented to: person, place, time and situation  Functional Communication   - Overall Status: within functional limits  Attention Span    - Attention: intact  Following Direction   - follows all commands and directions consistently  Transition Between Tasks   - transitions without difficulty  Executive Function    - Organization: intact   - Sequencing: intact   - Problem Solving: intact   - Termination: intact  Memory   - intact  Safety Awareness/Insight   - intact  Awareness of Deficits   - fully aware of deficits  Verbal Expression   - intact     Range of Motion (ROM)   (degrees unless noted; active unless noted; norms in ( ); negative=lacking to 0, positive=beyond 0)  WNL: LLE, RLE      Sitting Balance  (FINA = base of support)  Static      - Trial 1 details: independent  Dynamic      - Trial 1 details: independent    Standing Balance  (FINA = base of support)  Firm Surface: Double Leg      - Static, Eyes Open       - Trial 1 details: independent     - Dynamic, Eyes Open       - Trial 1 details: independent      Sensation/Dermatome Testing:    Intact: LLE light touch, RLE light touch    Bed Mobility  - Supine to sit: independent  - Sit to supine: independent  Transfers  Assistive devices: none  - Sit to stand: independent  - Stand to sit: independent    Ambulation / Gait  - Assistive device: no assistive device  - Distance (feet unless otherwise indicated): 150  - Assist Level: supervision  - Surface: even     Interventions     Training provided: bed mobility training, functional  ambulation, transfer training and gait training    Skilled input: Verbal instruction/cues  Verbal Consent: Writer verbally educated and received verbal consent for hand placement, positioning of patient, and techniques to be performed today from patient          Education:   - Present and ready to learn: patient    ASSESSMENT   Discharge needs based on today's assessment:   - Current level of function: at baseline level of function   - Therapy needs at discharge: does not require ongoing therapy    AM-PAC  Patient is very impulsive and would not follow safety instructions. Very reluctant to put the gait belt on. Was able to ambulate independently though. No acute care physical therapy needs at this time. Patient discharged from PT         Generalized Prior Level of Function:         Key: MOD A=moderate assistance, IND/MOD I=independent/modified independent  - Generalized Current Level of Function     - Current Mobility Score: 24       AM-PAC Scoring Key= >21 Modified Independent; 20-21 Supervision; 18-19 Minimal assist; 13-17 Moderate assist; 9-12 Max assist; <9 Total assist       • Predicted patient presentation: Low (stable) - Patient comorbidities and complexities, as defined above, will have little effect on progress for prescribed plan of care.    PLAN (while hospitalized)  Suggestions for next session as indicated:          PT/OT ADL Equipment for Discharge: none      Documented in the chart in the following areas: Prior Level of Function. Assessment/Plan.      Patient at End of Session:   Location: in chair  Handoff to: nurse      Therapy procedure time and total treatment time can be found documented on the Time Entry flowsheet   picked up in office. • Please allow the office 2-3 business days to fill the prescription. • Patient must present photo ID at time of . PLEASE NOTE: PRESCRIPTIONS MUST BE PICKED UP PRIOR TO 3:00PM MONDAY-FRIDAY    Scheduling Tests:     If your ph submitting forms to office staff. • Form completion may require an additional fee. • A signed Release of Information (ANA CRISTINA) must be on file before forms may be submitted. When dropping off forms, please ask the  for this paper.    • Failure